# Patient Record
Sex: FEMALE | Race: BLACK OR AFRICAN AMERICAN | NOT HISPANIC OR LATINO | Employment: FULL TIME | URBAN - METROPOLITAN AREA
[De-identification: names, ages, dates, MRNs, and addresses within clinical notes are randomized per-mention and may not be internally consistent; named-entity substitution may affect disease eponyms.]

---

## 2020-12-09 ENCOUNTER — SOCIAL WORK (OUTPATIENT)
Dept: ADMINISTRATIVE | Facility: OTHER | Age: 30
End: 2020-12-09

## 2020-12-09 NOTE — PROGRESS NOTES
SW met with pt regarding initial OB assessment. Pt stated this is her 3rd pregnancy/0-miscarriage. Pt stated lives alone with her children-4,2 and able to perform ADL's independently. Pt stated support system is her mother/Jael/father/Judah. Pt stated has medicaid([facility]). Pt stated does not have WIC. Pt stated is going to breastfeed. SW provide pt with information on other community resources.SW faxed and scanned pt's notification of pregnancy into epic.  No other needs identified at this time.    Sandra Coates,MSW  Pager#[phone]

## 2020-12-22 PROBLEM — Z34.91 SUPERVISION OF LOW-RISK PREGNANCY, FIRST TRIMESTER: Status: ACTIVE | Noted: 2020-12-22

## 2021-06-02 PROBLEM — O99.210 OBESITY IN PREGNANCY: Status: ACTIVE | Noted: 2021-06-02

## 2021-06-02 PROBLEM — O09.93 HIGH-RISK PREGNANCY IN THIRD TRIMESTER: Status: ACTIVE | Noted: 2020-12-22

## 2021-06-02 PROBLEM — B37.31 YEAST VAGINITIS: Status: ACTIVE | Noted: 2021-06-02

## 2021-06-02 PROBLEM — Z3A.34 34 WEEKS GESTATION OF PREGNANCY: Status: ACTIVE | Noted: 2021-06-02

## 2021-06-16 PROBLEM — Z3A.36 36 WEEKS GESTATION OF PREGNANCY: Status: ACTIVE | Noted: 2021-06-02

## 2021-06-23 PROBLEM — Z3A.37 37 WEEKS GESTATION OF PREGNANCY: Status: ACTIVE | Noted: 2021-06-02

## 2021-06-30 PROBLEM — O28.8 EQUIVOCAL NON-STRESS TEST: Status: ACTIVE | Noted: 2021-06-30

## 2021-08-05 PROBLEM — O09.93 HIGH-RISK PREGNANCY IN THIRD TRIMESTER: Status: RESOLVED | Noted: 2020-12-22 | Resolved: 2021-08-05

## 2021-08-05 PROBLEM — Z3A.37 37 WEEKS GESTATION OF PREGNANCY: Status: RESOLVED | Noted: 2021-06-02 | Resolved: 2021-08-05

## 2021-08-05 PROBLEM — O28.8 EQUIVOCAL NON-STRESS TEST: Status: RESOLVED | Noted: 2021-06-30 | Resolved: 2021-08-05

## 2021-08-13 PROBLEM — Z30.09 UNWANTED FERTILITY: Status: ACTIVE | Noted: 2021-08-13

## 2021-08-13 PROBLEM — Z98.51 STATUS POST TUBAL LIGATION: Status: ACTIVE | Noted: 2021-08-13

## 2021-08-13 PROBLEM — Z30.09 UNWANTED FERTILITY: Status: RESOLVED | Noted: 2021-08-13 | Resolved: 2021-08-13
